# Patient Record
Sex: FEMALE | Race: WHITE | NOT HISPANIC OR LATINO | URBAN - METROPOLITAN AREA
[De-identification: names, ages, dates, MRNs, and addresses within clinical notes are randomized per-mention and may not be internally consistent; named-entity substitution may affect disease eponyms.]

---

## 2019-06-12 ENCOUNTER — OFFICE (OUTPATIENT)
Dept: URBAN - METROPOLITAN AREA CLINIC 42 | Facility: CLINIC | Age: 59
End: 2019-06-12

## 2019-06-12 VITALS
TEMPERATURE: 98.3 F | WEIGHT: 171 LBS | DIASTOLIC BLOOD PRESSURE: 84 MMHG | HEIGHT: 66 IN | HEART RATE: 65 BPM | SYSTOLIC BLOOD PRESSURE: 130 MMHG

## 2019-06-12 DIAGNOSIS — K30 FUNCTIONAL DYSPEPSIA: ICD-10-CM

## 2019-06-12 DIAGNOSIS — R10.30 LOWER ABDOMINAL PAIN, UNSPECIFIED: ICD-10-CM

## 2019-06-12 DIAGNOSIS — K52.9 NONINFECTIVE GASTROENTERITIS AND COLITIS, UNSPECIFIED: ICD-10-CM

## 2019-06-12 PROCEDURE — 99204 OFFICE O/P NEW MOD 45 MIN: CPT

## 2019-06-21 ENCOUNTER — ON CAMPUS - OUTPATIENT (OUTPATIENT)
Dept: URBAN - METROPOLITAN AREA HOSPITAL 91 | Facility: HOSPITAL | Age: 59
End: 2019-06-21

## 2019-06-21 DIAGNOSIS — K52.832 LYMPHOCYTIC COLITIS: ICD-10-CM

## 2019-06-21 DIAGNOSIS — K29.60 OTHER GASTRITIS WITHOUT BLEEDING: ICD-10-CM

## 2019-06-21 DIAGNOSIS — R10.13 EPIGASTRIC PAIN: ICD-10-CM

## 2019-06-21 DIAGNOSIS — K44.9 DIAPHRAGMATIC HERNIA WITHOUT OBSTRUCTION OR GANGRENE: ICD-10-CM

## 2019-06-21 DIAGNOSIS — K57.30 DIVERTICULOSIS OF LARGE INTESTINE WITHOUT PERFORATION OR ABS: ICD-10-CM

## 2019-06-21 DIAGNOSIS — R63.4 ABNORMAL WEIGHT LOSS: ICD-10-CM

## 2019-06-21 PROCEDURE — 43239 EGD BIOPSY SINGLE/MULTIPLE: CPT

## 2019-06-21 PROCEDURE — 45380 COLONOSCOPY AND BIOPSY: CPT

## 2020-02-05 ENCOUNTER — HOSPITAL ENCOUNTER (OUTPATIENT)
Dept: GENERAL RADIOLOGY | Facility: HOSPITAL | Age: 60
Discharge: HOME | End: 2020-02-05

## 2022-07-13 ENCOUNTER — LAB (OUTPATIENT)
Dept: LAB | Facility: HOSPITAL | Age: 62
End: 2022-07-13

## 2022-07-13 ENCOUNTER — TRANSCRIBE ORDERS (OUTPATIENT)
Dept: LAB | Facility: HOSPITAL | Age: 62
End: 2022-07-13

## 2022-07-13 DIAGNOSIS — L40.1 GENERALIZED PUSTULAR PSORIASIS: ICD-10-CM

## 2022-07-13 DIAGNOSIS — L40.1 GENERALIZED PUSTULAR PSORIASIS: Primary | ICD-10-CM

## 2022-07-13 PROCEDURE — 86480 TB TEST CELL IMMUN MEASURE: CPT

## 2022-07-13 PROCEDURE — 36415 COLL VENOUS BLD VENIPUNCTURE: CPT

## 2022-07-15 LAB
GAMMA INTERFERON BACKGROUND BLD IA-ACNC: 0.02 IU/ML
M TB IFN-G BLD-IMP: NEGATIVE
M TB IFN-G CD4+ BCKGRND COR BLD-ACNC: 0.02 IU/ML
M TB IFN-G CD4+CD8+ BCKGRND COR BLD-ACNC: 0.02 IU/ML
MITOGEN IGNF BLD-ACNC: >10 IU/ML
QUANTIFERON INCUBATION: NORMAL
SERVICE CMNT-IMP: NORMAL

## 2022-12-29 ENCOUNTER — LAB (OUTPATIENT)
Dept: LAB | Facility: HOSPITAL | Age: 62
End: 2022-12-29
Payer: MEDICARE

## 2022-12-29 ENCOUNTER — TRANSCRIBE ORDERS (OUTPATIENT)
Dept: ADMINISTRATIVE | Facility: HOSPITAL | Age: 62
End: 2022-12-29
Payer: MEDICARE

## 2022-12-29 DIAGNOSIS — L40.59 POLYARTICULAR PSORIATIC ARTHRITIS: ICD-10-CM

## 2022-12-29 DIAGNOSIS — L40.1 IMPETIGO HERPETIFORMIS: ICD-10-CM

## 2022-12-29 DIAGNOSIS — Z79.899 ENCOUNTER FOR LONG-TERM (CURRENT) USE OF OTHER MEDICATIONS: ICD-10-CM

## 2022-12-29 DIAGNOSIS — L40.1 IMPETIGO HERPETIFORMIS: Primary | ICD-10-CM

## 2022-12-29 LAB
CHOLEST SERPL-MCNC: 157 MG/DL (ref 0–200)
HDLC SERPL-MCNC: 32 MG/DL (ref 40–60)
LDLC SERPL CALC-MCNC: 100 MG/DL (ref 0–100)
LDLC/HDLC SERPL: 3.05 {RATIO}
TRIGL SERPL-MCNC: 137 MG/DL (ref 0–150)
VLDLC SERPL-MCNC: 25 MG/DL (ref 5–40)

## 2022-12-29 PROCEDURE — 80061 LIPID PANEL: CPT

## 2022-12-29 PROCEDURE — 36415 COLL VENOUS BLD VENIPUNCTURE: CPT

## 2023-02-16 ENCOUNTER — APPOINTMENT (OUTPATIENT)
Dept: GENERAL RADIOLOGY | Facility: HOSPITAL | Age: 63
DRG: 871 | End: 2023-02-16
Payer: MEDICARE

## 2023-02-16 ENCOUNTER — TRANSCRIBE ORDERS (OUTPATIENT)
Dept: CARDIOLOGY | Facility: HOSPITAL | Age: 63
End: 2023-02-16
Payer: MEDICARE

## 2023-02-16 ENCOUNTER — HOSPITAL ENCOUNTER (INPATIENT)
Facility: HOSPITAL | Age: 63
LOS: 3 days | Discharge: HOME | DRG: 871 | End: 2023-02-20
Attending: EMERGENCY MEDICINE | Admitting: FAMILY MEDICINE
Payer: MEDICARE

## 2023-02-16 DIAGNOSIS — R53.1 GENERALIZED WEAKNESS: ICD-10-CM

## 2023-02-16 DIAGNOSIS — E87.20 LACTIC ACIDOSIS: ICD-10-CM

## 2023-02-16 DIAGNOSIS — R41.0 CONFUSION: ICD-10-CM

## 2023-02-16 DIAGNOSIS — R07.9 CHEST PAIN, UNSPECIFIED TYPE: Primary | ICD-10-CM

## 2023-02-16 DIAGNOSIS — I48.92 ATRIAL FLUTTER WITH RAPID VENTRICULAR RESPONSE: ICD-10-CM

## 2023-02-16 DIAGNOSIS — A41.9 SEPSIS, DUE TO UNSPECIFIED ORGANISM, UNSPECIFIED WHETHER ACUTE ORGAN DYSFUNCTION PRESENT: Primary | ICD-10-CM

## 2023-02-16 DIAGNOSIS — R26.2 DIFFICULTY WALKING: ICD-10-CM

## 2023-02-16 LAB
ALBUMIN SERPL-MCNC: 3.9 G/DL (ref 3.5–5.2)
ALBUMIN/GLOB SERPL: 1.1 G/DL
ALP SERPL-CCNC: 72 U/L (ref 39–117)
ALT SERPL W P-5'-P-CCNC: 15 U/L (ref 1–33)
ANION GAP SERPL CALCULATED.3IONS-SCNC: 17.2 MMOL/L (ref 5–15)
AST SERPL-CCNC: 12 U/L (ref 1–32)
BACTERIA UR QL AUTO: ABNORMAL /HPF
BASOPHILS # BLD AUTO: 0.04 10*3/MM3 (ref 0–0.2)
BASOPHILS NFR BLD AUTO: 0.2 % (ref 0–1.5)
BILIRUB SERPL-MCNC: 2.6 MG/DL (ref 0–1.2)
BILIRUB UR QL STRIP: ABNORMAL
BUN SERPL-MCNC: 19 MG/DL (ref 8–23)
BUN/CREAT SERPL: 13.6 (ref 7–25)
CALCIUM SPEC-SCNC: 9.2 MG/DL (ref 8.6–10.5)
CHLORIDE SERPL-SCNC: 98 MMOL/L (ref 98–107)
CLARITY UR: CLEAR
CO2 SERPL-SCNC: 17.8 MMOL/L (ref 22–29)
COLOR UR: ABNORMAL
CREAT SERPL-MCNC: 1.4 MG/DL (ref 0.57–1)
D-LACTATE SERPL-SCNC: 3.1 MMOL/L (ref 0.5–2)
DEPRECATED RDW RBC AUTO: 42 FL (ref 37–54)
EGFRCR SERPLBLD CKD-EPI 2021: 42.4 ML/MIN/1.73
EOSINOPHIL # BLD AUTO: 0.07 10*3/MM3 (ref 0–0.4)
EOSINOPHIL NFR BLD AUTO: 0.3 % (ref 0.3–6.2)
ERYTHROCYTE [DISTWIDTH] IN BLOOD BY AUTOMATED COUNT: 12.9 % (ref 12.3–15.4)
FLUAV AG NPH QL: NEGATIVE
FLUBV AG NPH QL IA: NEGATIVE
GIANT PLATELETS: NORMAL
GLOBULIN UR ELPH-MCNC: 3.4 GM/DL
GLUCOSE SERPL-MCNC: 292 MG/DL (ref 65–99)
GLUCOSE UR STRIP-MCNC: ABNORMAL MG/DL
HCT VFR BLD AUTO: 44.9 % (ref 34–46.6)
HGB BLD-MCNC: 15.1 G/DL (ref 12–15.9)
HGB UR QL STRIP.AUTO: ABNORMAL
HOLD SPECIMEN: NORMAL
HOLD SPECIMEN: NORMAL
HYALINE CASTS UR QL AUTO: ABNORMAL /LPF
IMM GRANULOCYTES # BLD AUTO: 1.06 10*3/MM3 (ref 0–0.05)
IMM GRANULOCYTES NFR BLD AUTO: 4.6 % (ref 0–0.5)
KETONES UR QL STRIP: ABNORMAL
LARGE PLATELETS: NORMAL
LEUKOCYTE ESTERASE UR QL STRIP.AUTO: NEGATIVE
LYMPHOCYTES # BLD AUTO: 1.72 10*3/MM3 (ref 0.7–3.1)
LYMPHOCYTES NFR BLD AUTO: 7.5 % (ref 19.6–45.3)
MCH RBC QN AUTO: 30 PG (ref 26.6–33)
MCHC RBC AUTO-ENTMCNC: 33.6 G/DL (ref 31.5–35.7)
MCV RBC AUTO: 89.1 FL (ref 79–97)
MONOCYTES # BLD AUTO: 1.8 10*3/MM3 (ref 0.1–0.9)
MONOCYTES NFR BLD AUTO: 7.9 % (ref 5–12)
NEUTROPHILS NFR BLD AUTO: 18.11 10*3/MM3 (ref 1.7–7)
NEUTROPHILS NFR BLD AUTO: 79.5 % (ref 42.7–76)
NITRITE UR QL STRIP: POSITIVE
NRBC BLD AUTO-RTO: 0 /100 WBC (ref 0–0.2)
PH UR STRIP.AUTO: 5.5 [PH] (ref 5–8)
PLATELET # BLD AUTO: 169 10*3/MM3 (ref 140–450)
PMV BLD AUTO: 9.8 FL (ref 6–12)
POTASSIUM SERPL-SCNC: 3.7 MMOL/L (ref 3.5–5.2)
PROT SERPL-MCNC: 7.3 G/DL (ref 6–8.5)
PROT UR QL STRIP: ABNORMAL
RBC # BLD AUTO: 5.04 10*6/MM3 (ref 3.77–5.28)
RBC # UR STRIP: ABNORMAL /HPF
RBC MORPH BLD: NORMAL
REF LAB TEST METHOD: ABNORMAL
SODIUM SERPL-SCNC: 133 MMOL/L (ref 136–145)
SP GR UR STRIP: 1.02 (ref 1–1.03)
SQUAMOUS #/AREA URNS HPF: ABNORMAL /HPF
UROBILINOGEN UR QL STRIP: ABNORMAL
WBC # UR STRIP: ABNORMAL /HPF
WBC MORPH BLD: NORMAL
WBC NRBC COR # BLD: 22.8 10*3/MM3 (ref 3.4–10.8)
WHOLE BLOOD HOLD COAG: NORMAL
WHOLE BLOOD HOLD SPECIMEN: NORMAL

## 2023-02-16 PROCEDURE — 71045 X-RAY EXAM CHEST 1 VIEW: CPT

## 2023-02-16 PROCEDURE — 87804 INFLUENZA ASSAY W/OPTIC: CPT | Performed by: EMERGENCY MEDICINE

## 2023-02-16 PROCEDURE — 80053 COMPREHEN METABOLIC PANEL: CPT | Performed by: EMERGENCY MEDICINE

## 2023-02-16 PROCEDURE — 87040 BLOOD CULTURE FOR BACTERIA: CPT | Performed by: EMERGENCY MEDICINE

## 2023-02-16 PROCEDURE — 93005 ELECTROCARDIOGRAM TRACING: CPT

## 2023-02-16 PROCEDURE — 85007 BL SMEAR W/DIFF WBC COUNT: CPT | Performed by: EMERGENCY MEDICINE

## 2023-02-16 PROCEDURE — U0004 COV-19 TEST NON-CDC HGH THRU: HCPCS | Performed by: EMERGENCY MEDICINE

## 2023-02-16 PROCEDURE — 99285 EMERGENCY DEPT VISIT HI MDM: CPT

## 2023-02-16 PROCEDURE — 93005 ELECTROCARDIOGRAM TRACING: CPT | Performed by: EMERGENCY MEDICINE

## 2023-02-16 PROCEDURE — 93010 ELECTROCARDIOGRAM REPORT: CPT | Performed by: INTERNAL MEDICINE

## 2023-02-16 PROCEDURE — 81001 URINALYSIS AUTO W/SCOPE: CPT | Performed by: EMERGENCY MEDICINE

## 2023-02-16 PROCEDURE — 83605 ASSAY OF LACTIC ACID: CPT | Performed by: EMERGENCY MEDICINE

## 2023-02-16 PROCEDURE — 85025 COMPLETE CBC W/AUTO DIFF WBC: CPT | Performed by: EMERGENCY MEDICINE

## 2023-02-16 PROCEDURE — 25010000002 ADENOSINE PER 6 MG

## 2023-02-16 PROCEDURE — 25010000002 MEROPENEM PER 100 MG: Performed by: EMERGENCY MEDICINE

## 2023-02-16 PROCEDURE — P9612 CATHETERIZE FOR URINE SPEC: HCPCS

## 2023-02-16 RX ORDER — BUPROPION HYDROCHLORIDE 150 MG/1
150 TABLET, EXTENDED RELEASE ORAL 2 TIMES DAILY
COMMUNITY

## 2023-02-16 RX ORDER — AMLODIPINE BESYLATE AND ATORVASTATIN CALCIUM 10; 40 MG/1; MG/1
1 TABLET, FILM COATED ORAL DAILY
COMMUNITY

## 2023-02-16 RX ORDER — ATORVASTATIN CALCIUM 80 MG/1
80 TABLET, FILM COATED ORAL DAILY
COMMUNITY

## 2023-02-16 RX ORDER — SODIUM CHLORIDE 0.9 % (FLUSH) 0.9 %
10 SYRINGE (ML) INJECTION AS NEEDED
Status: DISCONTINUED | OUTPATIENT
Start: 2023-02-16 | End: 2023-02-20 | Stop reason: HOSPADM

## 2023-02-16 RX ORDER — DILTIAZEM HCL IN NACL,ISO-OSM 125 MG/125
5-15 PLASTIC BAG, INJECTION (ML) INTRAVENOUS
Status: DISCONTINUED | OUTPATIENT
Start: 2023-02-16 | End: 2023-02-18

## 2023-02-16 RX ORDER — FAMOTIDINE 40 MG/1
40 TABLET, FILM COATED ORAL DAILY
COMMUNITY

## 2023-02-16 RX ORDER — ADENOSINE 3 MG/ML
6 INJECTION, SOLUTION INTRAVENOUS ONCE
Status: COMPLETED | OUTPATIENT
Start: 2023-02-16 | End: 2023-02-16

## 2023-02-16 RX ORDER — ADENOSINE 3 MG/ML
INJECTION, SOLUTION INTRAVENOUS
Status: COMPLETED
Start: 2023-02-16 | End: 2023-02-16

## 2023-02-16 RX ORDER — GLIPIZIDE 10 MG/1
10 TABLET, FILM COATED, EXTENDED RELEASE ORAL DAILY
COMMUNITY

## 2023-02-16 RX ADMIN — ADENOSINE 6 MG: 3 INJECTION INTRAVENOUS at 21:25

## 2023-02-16 RX ADMIN — MEROPENEM 1 G: 1 INJECTION, POWDER, FOR SOLUTION INTRAVENOUS at 23:06

## 2023-02-16 RX ADMIN — SODIUM CHLORIDE 1000 ML: 9 INJECTION, SOLUTION INTRAVENOUS at 21:22

## 2023-02-16 RX ADMIN — ADENOSINE 6 MG: 3 INJECTION, SOLUTION INTRAVENOUS at 21:25

## 2023-02-16 RX ADMIN — DILTIAZEM HYDROCHLORIDE 5 MG/HR: 5 INJECTION, SOLUTION INTRAVENOUS at 21:41

## 2023-02-16 RX ADMIN — Medication 10 ML: at 23:05

## 2023-02-17 ENCOUNTER — APPOINTMENT (OUTPATIENT)
Dept: CARDIOLOGY | Facility: HOSPITAL | Age: 63
DRG: 871 | End: 2023-02-17
Payer: MEDICARE

## 2023-02-17 PROBLEM — A41.9 SEPSIS, DUE TO UNSPECIFIED ORGANISM, UNSPECIFIED WHETHER ACUTE ORGAN DYSFUNCTION PRESENT (HCC): Status: ACTIVE | Noted: 2023-02-17

## 2023-02-17 LAB
ALBUMIN SERPL-MCNC: 3.4 G/DL (ref 3.5–5.2)
ALBUMIN/GLOB SERPL: 1.1 G/DL
ALP SERPL-CCNC: 61 U/L (ref 39–117)
ALT SERPL W P-5'-P-CCNC: 12 U/L (ref 1–33)
ANION GAP SERPL CALCULATED.3IONS-SCNC: 13.6 MMOL/L (ref 5–15)
AST SERPL-CCNC: 11 U/L (ref 1–32)
BASOPHILS # BLD AUTO: 0.04 10*3/MM3 (ref 0–0.2)
BASOPHILS NFR BLD AUTO: 0.2 % (ref 0–1.5)
BILIRUB SERPL-MCNC: 1.6 MG/DL (ref 0–1.2)
BUN SERPL-MCNC: 19 MG/DL (ref 8–23)
BUN/CREAT SERPL: 16.5 (ref 7–25)
CALCIUM SPEC-SCNC: 8.8 MG/DL (ref 8.6–10.5)
CHLORIDE SERPL-SCNC: 104 MMOL/L (ref 98–107)
CO2 SERPL-SCNC: 18.4 MMOL/L (ref 22–29)
CREAT SERPL-MCNC: 1.15 MG/DL (ref 0.57–1)
D-LACTATE SERPL-SCNC: 2 MMOL/L (ref 0.5–2)
D-LACTATE SERPL-SCNC: 2.1 MMOL/L (ref 0.5–2)
DEPRECATED RDW RBC AUTO: 42.4 FL (ref 37–54)
EGFRCR SERPLBLD CKD-EPI 2021: 53.6 ML/MIN/1.73
EOSINOPHIL # BLD AUTO: 0.01 10*3/MM3 (ref 0–0.4)
EOSINOPHIL NFR BLD AUTO: 0 % (ref 0.3–6.2)
ERYTHROCYTE [DISTWIDTH] IN BLOOD BY AUTOMATED COUNT: 12.9 % (ref 12.3–15.4)
GLOBULIN UR ELPH-MCNC: 3.1 GM/DL
GLUCOSE SERPL-MCNC: 192 MG/DL (ref 65–99)
HCT VFR BLD AUTO: 41 % (ref 34–46.6)
HGB BLD-MCNC: 14 G/DL (ref 12–15.9)
HOLD SPECIMEN: NORMAL
IMM GRANULOCYTES # BLD AUTO: 0.25 10*3/MM3 (ref 0–0.05)
IMM GRANULOCYTES NFR BLD AUTO: 1.2 % (ref 0–0.5)
L PNEUMO1 AG UR QL IA: NEGATIVE
LYMPHOCYTES # BLD AUTO: 2.65 10*3/MM3 (ref 0.7–3.1)
LYMPHOCYTES NFR BLD AUTO: 12.9 % (ref 19.6–45.3)
M PNEUMO IGM SER QL: NEGATIVE
MCH RBC QN AUTO: 30.6 PG (ref 26.6–33)
MCHC RBC AUTO-ENTMCNC: 34.1 G/DL (ref 31.5–35.7)
MCV RBC AUTO: 89.7 FL (ref 79–97)
MONOCYTES # BLD AUTO: 2.02 10*3/MM3 (ref 0.1–0.9)
MONOCYTES NFR BLD AUTO: 9.8 % (ref 5–12)
NEUTROPHILS NFR BLD AUTO: 15.56 10*3/MM3 (ref 1.7–7)
NEUTROPHILS NFR BLD AUTO: 75.9 % (ref 42.7–76)
NRBC BLD AUTO-RTO: 0 /100 WBC (ref 0–0.2)
PLATELET # BLD AUTO: 143 10*3/MM3 (ref 140–450)
PMV BLD AUTO: 9.2 FL (ref 6–12)
POTASSIUM SERPL-SCNC: 3.6 MMOL/L (ref 3.5–5.2)
PROCALCITONIN SERPL-MCNC: 6.65 NG/ML (ref 0–0.25)
PROT SERPL-MCNC: 6.5 G/DL (ref 6–8.5)
RBC # BLD AUTO: 4.57 10*6/MM3 (ref 3.77–5.28)
S PNEUM AG SPEC QL LA: NEGATIVE
SARS-COV-2 RNA RESP QL NAA+PROBE: NOT DETECTED
SODIUM SERPL-SCNC: 136 MMOL/L (ref 136–145)
WBC NRBC COR # BLD: 20.53 10*3/MM3 (ref 3.4–10.8)
WHOLE BLOOD HOLD COAG: NORMAL
WHOLE BLOOD HOLD SPECIMEN: NORMAL

## 2023-02-17 PROCEDURE — 83605 ASSAY OF LACTIC ACID: CPT | Performed by: EMERGENCY MEDICINE

## 2023-02-17 PROCEDURE — 25010000002 HEPARIN (PORCINE) PER 1000 UNITS: Performed by: FAMILY MEDICINE

## 2023-02-17 PROCEDURE — 87899 AGENT NOS ASSAY W/OPTIC: CPT | Performed by: FAMILY MEDICINE

## 2023-02-17 PROCEDURE — 93306 TTE W/DOPPLER COMPLETE: CPT

## 2023-02-17 PROCEDURE — 84145 PROCALCITONIN (PCT): CPT | Performed by: FAMILY MEDICINE

## 2023-02-17 PROCEDURE — 87205 SMEAR GRAM STAIN: CPT | Performed by: FAMILY MEDICINE

## 2023-02-17 PROCEDURE — 25010000002 VANCOMYCIN 5 G RECONSTITUTED SOLUTION: Performed by: FAMILY MEDICINE

## 2023-02-17 PROCEDURE — 87449 NOS EACH ORGANISM AG IA: CPT | Performed by: FAMILY MEDICINE

## 2023-02-17 PROCEDURE — 87040 BLOOD CULTURE FOR BACTERIA: CPT | Performed by: FAMILY MEDICINE

## 2023-02-17 PROCEDURE — 99223 1ST HOSP IP/OBS HIGH 75: CPT | Performed by: FAMILY MEDICINE

## 2023-02-17 PROCEDURE — 80053 COMPREHEN METABOLIC PANEL: CPT | Performed by: FAMILY MEDICINE

## 2023-02-17 PROCEDURE — 87070 CULTURE OTHR SPECIMN AEROBIC: CPT | Performed by: FAMILY MEDICINE

## 2023-02-17 PROCEDURE — 85025 COMPLETE CBC W/AUTO DIFF WBC: CPT | Performed by: FAMILY MEDICINE

## 2023-02-17 PROCEDURE — 25010000002 ONDANSETRON PER 1 MG: Performed by: FAMILY MEDICINE

## 2023-02-17 PROCEDURE — 94799 UNLISTED PULMONARY SVC/PX: CPT

## 2023-02-17 PROCEDURE — 86738 MYCOPLASMA ANTIBODY: CPT | Performed by: FAMILY MEDICINE

## 2023-02-17 PROCEDURE — 25010000002 MEROPENEM PER 100 MG: Performed by: FAMILY MEDICINE

## 2023-02-17 PROCEDURE — 25010000002 VANCOMYCIN 5 G RECONSTITUTED SOLUTION: Performed by: EMERGENCY MEDICINE

## 2023-02-17 PROCEDURE — 36415 COLL VENOUS BLD VENIPUNCTURE: CPT | Performed by: EMERGENCY MEDICINE

## 2023-02-17 RX ORDER — ACETAMINOPHEN 325 MG/1
650 TABLET ORAL EVERY 6 HOURS PRN
Status: DISCONTINUED | OUTPATIENT
Start: 2023-02-17 | End: 2023-02-20 | Stop reason: HOSPADM

## 2023-02-17 RX ORDER — SODIUM CHLORIDE 0.9 % (FLUSH) 0.9 %
10 SYRINGE (ML) INJECTION EVERY 12 HOURS SCHEDULED
Status: DISCONTINUED | OUTPATIENT
Start: 2023-02-17 | End: 2023-02-20 | Stop reason: HOSPADM

## 2023-02-17 RX ORDER — SODIUM CHLORIDE 0.9 % (FLUSH) 0.9 %
10 SYRINGE (ML) INJECTION AS NEEDED
Status: DISCONTINUED | OUTPATIENT
Start: 2023-02-17 | End: 2023-02-20 | Stop reason: HOSPADM

## 2023-02-17 RX ORDER — SODIUM CHLORIDE 9 MG/ML
40 INJECTION, SOLUTION INTRAVENOUS AS NEEDED
Status: DISCONTINUED | OUTPATIENT
Start: 2023-02-17 | End: 2023-02-20 | Stop reason: HOSPADM

## 2023-02-17 RX ORDER — ONDANSETRON 2 MG/ML
4 INJECTION INTRAMUSCULAR; INTRAVENOUS EVERY 6 HOURS PRN
Status: DISCONTINUED | OUTPATIENT
Start: 2023-02-17 | End: 2023-02-20 | Stop reason: HOSPADM

## 2023-02-17 RX ORDER — SODIUM CHLORIDE 9 MG/ML
125 INJECTION, SOLUTION INTRAVENOUS CONTINUOUS
Status: DISCONTINUED | OUTPATIENT
Start: 2023-02-17 | End: 2023-02-18

## 2023-02-17 RX ORDER — HEPARIN SODIUM 5000 [USP'U]/ML
5000 INJECTION, SOLUTION INTRAVENOUS; SUBCUTANEOUS EVERY 8 HOURS SCHEDULED
Status: DISCONTINUED | OUTPATIENT
Start: 2023-02-17 | End: 2023-02-20 | Stop reason: HOSPADM

## 2023-02-17 RX ORDER — NITROGLYCERIN 0.4 MG/1
0.4 TABLET SUBLINGUAL
Status: DISCONTINUED | OUTPATIENT
Start: 2023-02-17 | End: 2023-02-20 | Stop reason: HOSPADM

## 2023-02-17 RX ADMIN — Medication 10 ML: at 08:35

## 2023-02-17 RX ADMIN — MEROPENEM 1 G: 1 INJECTION, POWDER, FOR SOLUTION INTRAVENOUS at 23:10

## 2023-02-17 RX ADMIN — VANCOMYCIN HYDROCHLORIDE 750 MG: 5 INJECTION, POWDER, LYOPHILIZED, FOR SOLUTION INTRAVENOUS at 23:11

## 2023-02-17 RX ADMIN — ACETAMINOPHEN 650 MG: 325 TABLET ORAL at 23:18

## 2023-02-17 RX ADMIN — VANCOMYCIN HYDROCHLORIDE 1750 MG: 5 INJECTION, POWDER, LYOPHILIZED, FOR SOLUTION INTRAVENOUS at 00:10

## 2023-02-17 RX ADMIN — ACETAMINOPHEN 650 MG: 325 TABLET ORAL at 14:00

## 2023-02-17 RX ADMIN — HEPARIN SODIUM 5000 UNITS: 5000 INJECTION INTRAVENOUS; SUBCUTANEOUS at 21:22

## 2023-02-17 RX ADMIN — HEPARIN SODIUM 5000 UNITS: 5000 INJECTION INTRAVENOUS; SUBCUTANEOUS at 13:40

## 2023-02-17 RX ADMIN — DILTIAZEM HYDROCHLORIDE 15 MG/HR: 5 INJECTION, SOLUTION INTRAVENOUS at 04:05

## 2023-02-17 RX ADMIN — MEROPENEM 1 G: 1 INJECTION, POWDER, FOR SOLUTION INTRAVENOUS at 16:24

## 2023-02-17 RX ADMIN — MEROPENEM 1 G: 1 INJECTION, POWDER, FOR SOLUTION INTRAVENOUS at 08:35

## 2023-02-17 RX ADMIN — SODIUM CHLORIDE 125 ML/HR: 9 INJECTION, SOLUTION INTRAVENOUS at 03:29

## 2023-02-17 RX ADMIN — VANCOMYCIN HYDROCHLORIDE 750 MG: 5 INJECTION, POWDER, LYOPHILIZED, FOR SOLUTION INTRAVENOUS at 12:01

## 2023-02-17 RX ADMIN — SODIUM CHLORIDE 125 ML/HR: 9 INJECTION, SOLUTION INTRAVENOUS at 16:34

## 2023-02-17 RX ADMIN — ONDANSETRON 4 MG: 2 INJECTION INTRAMUSCULAR; INTRAVENOUS at 17:09

## 2023-02-17 RX ADMIN — DOXYCYCLINE 100 MG: 100 INJECTION, POWDER, LYOPHILIZED, FOR SOLUTION INTRAVENOUS at 04:48

## 2023-02-17 RX ADMIN — HEPARIN SODIUM 5000 UNITS: 5000 INJECTION INTRAVENOUS; SUBCUTANEOUS at 06:01

## 2023-02-17 RX ADMIN — DOXYCYCLINE 100 MG: 100 INJECTION, POWDER, LYOPHILIZED, FOR SOLUTION INTRAVENOUS at 13:40

## 2023-02-18 ENCOUNTER — APPOINTMENT (OUTPATIENT)
Dept: CT IMAGING | Facility: HOSPITAL | Age: 63
DRG: 871 | End: 2023-02-18
Payer: MEDICARE

## 2023-02-18 LAB
ALBUMIN SERPL-MCNC: 3 G/DL (ref 3.5–5.2)
ALP SERPL-CCNC: 58 U/L (ref 39–117)
ALT SERPL W P-5'-P-CCNC: 9 U/L (ref 1–33)
ANION GAP SERPL CALCULATED.3IONS-SCNC: 7.3 MMOL/L (ref 5–15)
AST SERPL-CCNC: 10 U/L (ref 1–32)
BASOPHILS # BLD AUTO: 0.05 10*3/MM3 (ref 0–0.2)
BASOPHILS NFR BLD AUTO: 0.5 % (ref 0–1.5)
BILIRUB CONJ SERPL-MCNC: 0.2 MG/DL (ref 0–0.3)
BILIRUB INDIRECT SERPL-MCNC: 0.3 MG/DL
BILIRUB SERPL-MCNC: 0.5 MG/DL (ref 0–1.2)
BUN SERPL-MCNC: 16 MG/DL (ref 8–23)
BUN/CREAT SERPL: 17.8 (ref 7–25)
CALCIUM SPEC-SCNC: 8.5 MG/DL (ref 8.6–10.5)
CHLORIDE SERPL-SCNC: 108 MMOL/L (ref 98–107)
CO2 SERPL-SCNC: 21.7 MMOL/L (ref 22–29)
CREAT SERPL-MCNC: 0.9 MG/DL (ref 0.57–1)
DEPRECATED RDW RBC AUTO: 43.2 FL (ref 37–54)
EGFRCR SERPLBLD CKD-EPI 2021: 72 ML/MIN/1.73
EOSINOPHIL # BLD AUTO: 0.27 10*3/MM3 (ref 0–0.4)
EOSINOPHIL NFR BLD AUTO: 2.5 % (ref 0.3–6.2)
ERYTHROCYTE [DISTWIDTH] IN BLOOD BY AUTOMATED COUNT: 13.1 % (ref 12.3–15.4)
GLUCOSE BLDC GLUCOMTR-MCNC: 140 MG/DL (ref 70–99)
GLUCOSE BLDC GLUCOMTR-MCNC: 144 MG/DL (ref 70–99)
GLUCOSE SERPL-MCNC: 146 MG/DL (ref 65–99)
HCT VFR BLD AUTO: 34.3 % (ref 34–46.6)
HGB BLD-MCNC: 11.4 G/DL (ref 12–15.9)
IMM GRANULOCYTES # BLD AUTO: 0.08 10*3/MM3 (ref 0–0.05)
IMM GRANULOCYTES NFR BLD AUTO: 0.7 % (ref 0–0.5)
LYMPHOCYTES # BLD AUTO: 1.9 10*3/MM3 (ref 0.7–3.1)
LYMPHOCYTES NFR BLD AUTO: 17.8 % (ref 19.6–45.3)
MAGNESIUM SERPL-MCNC: 1.8 MG/DL (ref 1.6–2.4)
MCH RBC QN AUTO: 30.2 PG (ref 26.6–33)
MCHC RBC AUTO-ENTMCNC: 33.2 G/DL (ref 31.5–35.7)
MCV RBC AUTO: 91 FL (ref 79–97)
MONOCYTES # BLD AUTO: 0.97 10*3/MM3 (ref 0.1–0.9)
MONOCYTES NFR BLD AUTO: 9.1 % (ref 5–12)
NEUTROPHILS NFR BLD AUTO: 69.4 % (ref 42.7–76)
NEUTROPHILS NFR BLD AUTO: 7.41 10*3/MM3 (ref 1.7–7)
NRBC BLD AUTO-RTO: 0 /100 WBC (ref 0–0.2)
PHOSPHATE SERPL-MCNC: 1.8 MG/DL (ref 2.5–4.5)
PLATELET # BLD AUTO: 135 10*3/MM3 (ref 140–450)
PMV BLD AUTO: 10 FL (ref 6–12)
POTASSIUM SERPL-SCNC: 4.6 MMOL/L (ref 3.5–5.2)
PROT SERPL-MCNC: 5.8 G/DL (ref 6–8.5)
RBC # BLD AUTO: 3.77 10*6/MM3 (ref 3.77–5.28)
SODIUM SERPL-SCNC: 137 MMOL/L (ref 136–145)
WBC NRBC COR # BLD: 10.68 10*3/MM3 (ref 3.4–10.8)

## 2023-02-18 PROCEDURE — 97161 PT EVAL LOW COMPLEX 20 MIN: CPT

## 2023-02-18 PROCEDURE — 82962 GLUCOSE BLOOD TEST: CPT

## 2023-02-18 PROCEDURE — 0 IOPAMIDOL PER 1 ML: Performed by: FAMILY MEDICINE

## 2023-02-18 PROCEDURE — 25010000002 HEPARIN (PORCINE) PER 1000 UNITS: Performed by: FAMILY MEDICINE

## 2023-02-18 PROCEDURE — 63710000001 INSULIN LISPRO (HUMAN) PER 5 UNITS: Performed by: FAMILY MEDICINE

## 2023-02-18 PROCEDURE — 83735 ASSAY OF MAGNESIUM: CPT | Performed by: FAMILY MEDICINE

## 2023-02-18 PROCEDURE — 71260 CT THORAX DX C+: CPT

## 2023-02-18 PROCEDURE — 84100 ASSAY OF PHOSPHORUS: CPT | Performed by: FAMILY MEDICINE

## 2023-02-18 PROCEDURE — 80076 HEPATIC FUNCTION PANEL: CPT | Performed by: FAMILY MEDICINE

## 2023-02-18 PROCEDURE — 85025 COMPLETE CBC W/AUTO DIFF WBC: CPT | Performed by: FAMILY MEDICINE

## 2023-02-18 PROCEDURE — 99233 SBSQ HOSP IP/OBS HIGH 50: CPT | Performed by: FAMILY MEDICINE

## 2023-02-18 PROCEDURE — 80048 BASIC METABOLIC PNL TOTAL CA: CPT | Performed by: FAMILY MEDICINE

## 2023-02-18 PROCEDURE — 94799 UNLISTED PULMONARY SVC/PX: CPT

## 2023-02-18 RX ORDER — ATORVASTATIN CALCIUM 40 MG/1
80 TABLET, FILM COATED ORAL DAILY
Status: DISCONTINUED | OUTPATIENT
Start: 2023-02-18 | End: 2023-02-20 | Stop reason: HOSPADM

## 2023-02-18 RX ORDER — NICOTINE POLACRILEX 4 MG
15 LOZENGE BUCCAL
Status: DISCONTINUED | OUTPATIENT
Start: 2023-02-18 | End: 2023-02-20 | Stop reason: HOSPADM

## 2023-02-18 RX ORDER — FENTANYL/ROPIVACAINE/NS/PF 2-625MCG/1
15 PLASTIC BAG, INJECTION (ML) EPIDURAL
Status: COMPLETED | OUTPATIENT
Start: 2023-02-18 | End: 2023-02-18

## 2023-02-18 RX ORDER — FAMOTIDINE 20 MG/1
40 TABLET, FILM COATED ORAL DAILY
Status: DISCONTINUED | OUTPATIENT
Start: 2023-02-18 | End: 2023-02-20 | Stop reason: HOSPADM

## 2023-02-18 RX ORDER — BUPROPION HYDROCHLORIDE 150 MG/1
150 TABLET, EXTENDED RELEASE ORAL 2 TIMES DAILY
Status: DISCONTINUED | OUTPATIENT
Start: 2023-02-18 | End: 2023-02-20 | Stop reason: HOSPADM

## 2023-02-18 RX ORDER — INSULIN LISPRO 100 [IU]/ML
1-200 INJECTION, SOLUTION INTRAVENOUS; SUBCUTANEOUS AS NEEDED
Status: DISCONTINUED | OUTPATIENT
Start: 2023-02-18 | End: 2023-02-20 | Stop reason: HOSPADM

## 2023-02-18 RX ORDER — DEXTROSE MONOHYDRATE 25 G/50ML
10-50 INJECTION, SOLUTION INTRAVENOUS
Status: DISCONTINUED | OUTPATIENT
Start: 2023-02-18 | End: 2023-02-20 | Stop reason: HOSPADM

## 2023-02-18 RX ORDER — INSULIN LISPRO 100 [IU]/ML
1-200 INJECTION, SOLUTION INTRAVENOUS; SUBCUTANEOUS
Status: DISCONTINUED | OUTPATIENT
Start: 2023-02-18 | End: 2023-02-20 | Stop reason: HOSPADM

## 2023-02-18 RX ADMIN — HEPARIN SODIUM 5000 UNITS: 5000 INJECTION INTRAVENOUS; SUBCUTANEOUS at 22:13

## 2023-02-18 RX ADMIN — POTASSIUM PHOSPHATE, MONOBASIC AND POTASSIUM PHOSPHATE, DIBASIC 15 MMOL: 224; 236 INJECTION, SOLUTION, CONCENTRATE INTRAVENOUS at 09:07

## 2023-02-18 RX ADMIN — POTASSIUM PHOSPHATE, MONOBASIC AND POTASSIUM PHOSPHATE, DIBASIC 15 MMOL: 224; 236 INJECTION, SOLUTION, CONCENTRATE INTRAVENOUS at 06:29

## 2023-02-18 RX ADMIN — ACETAMINOPHEN 650 MG: 325 TABLET ORAL at 05:03

## 2023-02-18 RX ADMIN — DOXYCYCLINE 100 MG: 100 INJECTION, POWDER, LYOPHILIZED, FOR SOLUTION INTRAVENOUS at 01:18

## 2023-02-18 RX ADMIN — HEPARIN SODIUM 5000 UNITS: 5000 INJECTION INTRAVENOUS; SUBCUTANEOUS at 05:12

## 2023-02-18 RX ADMIN — INSULIN LISPRO 1 UNITS: 100 INJECTION, SOLUTION INTRAVENOUS; SUBCUTANEOUS at 18:41

## 2023-02-18 RX ADMIN — Medication 10 ML: at 09:07

## 2023-02-18 RX ADMIN — FAMOTIDINE 40 MG: 20 TABLET ORAL at 14:19

## 2023-02-18 RX ADMIN — IOPAMIDOL 100 ML: 755 INJECTION, SOLUTION INTRAVENOUS at 18:16

## 2023-02-18 RX ADMIN — BUPROPION HYDROCHLORIDE 150 MG: 150 TABLET, EXTENDED RELEASE ORAL at 22:12

## 2023-02-18 RX ADMIN — HEPARIN SODIUM 5000 UNITS: 5000 INJECTION INTRAVENOUS; SUBCUTANEOUS at 14:19

## 2023-02-18 RX ADMIN — DOXYCYCLINE 100 MG: 100 INJECTION, POWDER, LYOPHILIZED, FOR SOLUTION INTRAVENOUS at 16:30

## 2023-02-18 RX ADMIN — ATORVASTATIN CALCIUM 80 MG: 40 TABLET, FILM COATED ORAL at 14:19

## 2023-02-18 RX ADMIN — METOPROLOL TARTRATE 25 MG: 25 TABLET, FILM COATED ORAL at 22:12

## 2023-02-19 LAB
ALBUMIN SERPL-MCNC: 3.2 G/DL (ref 3.5–5.2)
ALP SERPL-CCNC: 63 U/L (ref 39–117)
ALT SERPL W P-5'-P-CCNC: 12 U/L (ref 1–33)
ANION GAP SERPL CALCULATED.3IONS-SCNC: 9.5 MMOL/L (ref 5–15)
ASCENDING AORTA: 3 CM
AST SERPL-CCNC: 12 U/L (ref 1–32)
BACTERIA SPEC RESP CULT: NORMAL
BASOPHILS # BLD AUTO: 0.04 10*3/MM3 (ref 0–0.2)
BASOPHILS NFR BLD AUTO: 0.5 % (ref 0–1.5)
BH CV ECHO MEAS - AO ROOT DIAM: 2.8 CM
BH CV ECHO MEAS - EDV(MOD-SP2): 55 ML
BH CV ECHO MEAS - EDV(MOD-SP4): 75 ML
BH CV ECHO MEAS - EF(MOD-BP): 67 %
BH CV ECHO MEAS - ESV(MOD-SP2): 17 ML
BH CV ECHO MEAS - ESV(MOD-SP4): 25 ML
BH CV ECHO MEAS - IVSD: 1.1 CM
BH CV ECHO MEAS - LA DIMENSION: 4.3 CM
BH CV ECHO MEAS - LAT PEAK E' VEL: 7.4 CM/SEC
BH CV ECHO MEAS - LVIDD: 3.8 CM
BH CV ECHO MEAS - LVIDS: 2.5 CM
BH CV ECHO MEAS - LVOT DIAM: 2 CM
BH CV ECHO MEAS - LVPWD: 1 CM
BH CV ECHO MEAS - MED PEAK E' VEL: 5.66 CM/SEC
BH CV ECHO MEAS - MV A MAX VEL: 85 CM/SEC
BH CV ECHO MEAS - MV DEC TIME: 222 MSEC
BH CV ECHO MEAS - MV E MAX VEL: 118 CM/SEC
BH CV ECHO MEAS - MV E/A: 1.4
BH CV ECHO MEAS - RAP SYSTOLE: 3 MMHG
BH CV ECHO MEAS - RVDD: 2.7 CM
BH CV ECHO MEAS - RVSP: 27 MMHG
BH CV ECHO MEAS - TR MAX PG: 24 MMHG
BH CV ECHO MEAS - TR MAX VEL: 247 CM/SEC
BH CV ECHO MEASUREMENTS AVERAGE E/E' RATIO: 18.07
BILIRUB CONJ SERPL-MCNC: 0.2 MG/DL (ref 0–0.3)
BILIRUB INDIRECT SERPL-MCNC: 0.3 MG/DL
BILIRUB SERPL-MCNC: 0.5 MG/DL (ref 0–1.2)
BUN SERPL-MCNC: 13 MG/DL (ref 8–23)
BUN/CREAT SERPL: 13.1 (ref 7–25)
CALCIUM SPEC-SCNC: 9 MG/DL (ref 8.6–10.5)
CHLORIDE SERPL-SCNC: 104 MMOL/L (ref 98–107)
CO2 SERPL-SCNC: 22.5 MMOL/L (ref 22–29)
CREAT SERPL-MCNC: 0.99 MG/DL (ref 0.57–1)
DEPRECATED RDW RBC AUTO: 43.6 FL (ref 37–54)
EGFRCR SERPLBLD CKD-EPI 2021: 64.2 ML/MIN/1.73
EOSINOPHIL # BLD AUTO: 0.31 10*3/MM3 (ref 0–0.4)
EOSINOPHIL NFR BLD AUTO: 3.6 % (ref 0.3–6.2)
ERYTHROCYTE [DISTWIDTH] IN BLOOD BY AUTOMATED COUNT: 13.2 % (ref 12.3–15.4)
GLUCOSE BLDC GLUCOMTR-MCNC: 114 MG/DL (ref 70–99)
GLUCOSE BLDC GLUCOMTR-MCNC: 120 MG/DL (ref 70–99)
GLUCOSE BLDC GLUCOMTR-MCNC: 132 MG/DL (ref 70–99)
GLUCOSE BLDC GLUCOMTR-MCNC: 175 MG/DL (ref 70–99)
GLUCOSE SERPL-MCNC: 130 MG/DL (ref 65–99)
GRAM STN SPEC: NORMAL
HCT VFR BLD AUTO: 34 % (ref 34–46.6)
HGB BLD-MCNC: 11.4 G/DL (ref 12–15.9)
IMM GRANULOCYTES # BLD AUTO: 0.07 10*3/MM3 (ref 0–0.05)
IMM GRANULOCYTES NFR BLD AUTO: 0.8 % (ref 0–0.5)
IVRT: 82 MSEC
LEFT ATRIUM VOLUME INDEX: 30.8 ML/M2
LYMPHOCYTES # BLD AUTO: 2.28 10*3/MM3 (ref 0.7–3.1)
LYMPHOCYTES NFR BLD AUTO: 26.6 % (ref 19.6–45.3)
MAGNESIUM SERPL-MCNC: 1.7 MG/DL (ref 1.6–2.4)
MAXIMAL PREDICTED HEART RATE: 157 BPM
MCH RBC QN AUTO: 30.1 PG (ref 26.6–33)
MCHC RBC AUTO-ENTMCNC: 33.5 G/DL (ref 31.5–35.7)
MCV RBC AUTO: 89.7 FL (ref 79–97)
MONOCYTES # BLD AUTO: 0.83 10*3/MM3 (ref 0.1–0.9)
MONOCYTES NFR BLD AUTO: 9.7 % (ref 5–12)
NEUTROPHILS NFR BLD AUTO: 5.04 10*3/MM3 (ref 1.7–7)
NEUTROPHILS NFR BLD AUTO: 58.8 % (ref 42.7–76)
NRBC BLD AUTO-RTO: 0 /100 WBC (ref 0–0.2)
PHOSPHATE SERPL-MCNC: 3.1 MG/DL (ref 2.5–4.5)
PLATELET # BLD AUTO: 187 10*3/MM3 (ref 140–450)
PMV BLD AUTO: 9.5 FL (ref 6–12)
POTASSIUM SERPL-SCNC: 3.9 MMOL/L (ref 3.5–5.2)
PROCALCITONIN SERPL-MCNC: 2.58 NG/ML (ref 0–0.25)
PROT SERPL-MCNC: 6.1 G/DL (ref 6–8.5)
QT INTERVAL: 292 MS
QT INTERVAL: 301 MS
RBC # BLD AUTO: 3.79 10*6/MM3 (ref 3.77–5.28)
SODIUM SERPL-SCNC: 136 MMOL/L (ref 136–145)
STRESS TARGET HR: 133 BPM
WBC NRBC COR # BLD: 8.57 10*3/MM3 (ref 3.4–10.8)

## 2023-02-19 PROCEDURE — 82962 GLUCOSE BLOOD TEST: CPT

## 2023-02-19 PROCEDURE — 84145 PROCALCITONIN (PCT): CPT | Performed by: FAMILY MEDICINE

## 2023-02-19 PROCEDURE — 80076 HEPATIC FUNCTION PANEL: CPT | Performed by: FAMILY MEDICINE

## 2023-02-19 PROCEDURE — 85025 COMPLETE CBC W/AUTO DIFF WBC: CPT | Performed by: FAMILY MEDICINE

## 2023-02-19 PROCEDURE — 84100 ASSAY OF PHOSPHORUS: CPT | Performed by: FAMILY MEDICINE

## 2023-02-19 PROCEDURE — 25010000002 HEPARIN (PORCINE) PER 1000 UNITS: Performed by: FAMILY MEDICINE

## 2023-02-19 PROCEDURE — 80048 BASIC METABOLIC PNL TOTAL CA: CPT | Performed by: FAMILY MEDICINE

## 2023-02-19 PROCEDURE — 99232 SBSQ HOSP IP/OBS MODERATE 35: CPT | Performed by: FAMILY MEDICINE

## 2023-02-19 PROCEDURE — 94799 UNLISTED PULMONARY SVC/PX: CPT

## 2023-02-19 PROCEDURE — 83735 ASSAY OF MAGNESIUM: CPT | Performed by: FAMILY MEDICINE

## 2023-02-19 RX ADMIN — METOPROLOL TARTRATE 25 MG: 25 TABLET, FILM COATED ORAL at 20:55

## 2023-02-19 RX ADMIN — METOPROLOL TARTRATE 25 MG: 25 TABLET, FILM COATED ORAL at 09:48

## 2023-02-19 RX ADMIN — HEPARIN SODIUM 5000 UNITS: 5000 INJECTION INTRAVENOUS; SUBCUTANEOUS at 21:42

## 2023-02-19 RX ADMIN — Medication 10 ML: at 09:49

## 2023-02-19 RX ADMIN — ACETAMINOPHEN 650 MG: 325 TABLET ORAL at 12:16

## 2023-02-19 RX ADMIN — HEPARIN SODIUM 5000 UNITS: 5000 INJECTION INTRAVENOUS; SUBCUTANEOUS at 13:44

## 2023-02-19 RX ADMIN — ATORVASTATIN CALCIUM 80 MG: 40 TABLET, FILM COATED ORAL at 09:49

## 2023-02-19 RX ADMIN — HEPARIN SODIUM 5000 UNITS: 5000 INJECTION INTRAVENOUS; SUBCUTANEOUS at 05:59

## 2023-02-19 RX ADMIN — FAMOTIDINE 40 MG: 20 TABLET ORAL at 09:48

## 2023-02-19 RX ADMIN — DOXYCYCLINE 100 MG: 100 INJECTION, POWDER, LYOPHILIZED, FOR SOLUTION INTRAVENOUS at 01:12

## 2023-02-19 RX ADMIN — DOXYCYCLINE 100 MG: 100 INJECTION, POWDER, LYOPHILIZED, FOR SOLUTION INTRAVENOUS at 13:43

## 2023-02-19 RX ADMIN — BUPROPION HYDROCHLORIDE 150 MG: 150 TABLET, EXTENDED RELEASE ORAL at 20:55

## 2023-02-19 RX ADMIN — Medication 10 ML: at 20:55

## 2023-02-19 RX ADMIN — BUPROPION HYDROCHLORIDE 150 MG: 150 TABLET, EXTENDED RELEASE ORAL at 09:48

## 2023-02-20 VITALS
HEIGHT: 64 IN | HEART RATE: 74 BPM | WEIGHT: 188.93 LBS | RESPIRATION RATE: 18 BRPM | DIASTOLIC BLOOD PRESSURE: 107 MMHG | SYSTOLIC BLOOD PRESSURE: 141 MMHG | BODY MASS INDEX: 32.26 KG/M2 | OXYGEN SATURATION: 96 % | TEMPERATURE: 98.8 F

## 2023-02-20 PROBLEM — F41.8 MIXED ANXIETY AND DEPRESSIVE DISORDER: Status: ACTIVE | Noted: 2019-01-15

## 2023-02-20 PROBLEM — K21.9 GASTROESOPHAGEAL REFLUX DISEASE: Status: ACTIVE | Noted: 2017-06-09

## 2023-02-20 PROBLEM — I10 ESSENTIAL HYPERTENSION: Status: ACTIVE | Noted: 2019-01-15

## 2023-02-20 PROBLEM — A41.9 SEPSIS, DUE TO UNSPECIFIED ORGANISM, UNSPECIFIED WHETHER ACUTE ORGAN DYSFUNCTION PRESENT: Status: RESOLVED | Noted: 2023-02-17 | Resolved: 2023-02-20

## 2023-02-20 PROBLEM — G43.909 MIGRAINE HEADACHE: Status: ACTIVE | Noted: 2023-02-20

## 2023-02-20 PROBLEM — J44.1 COPD WITH ACUTE EXACERBATION: Status: ACTIVE | Noted: 2023-02-20

## 2023-02-20 PROBLEM — I48.0 PAROXYSMAL ATRIAL FIBRILLATION: Status: ACTIVE | Noted: 2022-01-31

## 2023-02-20 PROBLEM — E78.5 HYPERLIPIDEMIA: Status: ACTIVE | Noted: 2019-06-13

## 2023-02-20 PROBLEM — Z90.5 ABSENT KIDNEY: Status: ACTIVE | Noted: 2022-01-31

## 2023-02-20 PROBLEM — E11.9 TYPE 2 DIABETES MELLITUS: Status: ACTIVE | Noted: 2020-02-03

## 2023-02-20 PROBLEM — J18.9 PNEUMONIA OF RIGHT LOWER LOBE DUE TO INFECTIOUS ORGANISM: Status: ACTIVE | Noted: 2023-02-20

## 2023-02-20 PROBLEM — N18.30 STAGE 3 CHRONIC KIDNEY DISEASE: Status: ACTIVE | Noted: 2022-01-31

## 2023-02-20 LAB
ALBUMIN SERPL-MCNC: 3.7 G/DL (ref 3.5–5.2)
ALP SERPL-CCNC: 66 U/L (ref 39–117)
ALT SERPL W P-5'-P-CCNC: 14 U/L (ref 1–33)
ANION GAP SERPL CALCULATED.3IONS-SCNC: 11.4 MMOL/L (ref 5–15)
AST SERPL-CCNC: 16 U/L (ref 1–32)
BASOPHILS # BLD AUTO: 0.05 10*3/MM3 (ref 0–0.2)
BASOPHILS NFR BLD AUTO: 0.6 % (ref 0–1.5)
BILIRUB CONJ SERPL-MCNC: 0.2 MG/DL (ref 0–0.3)
BILIRUB INDIRECT SERPL-MCNC: 0.6 MG/DL
BILIRUB SERPL-MCNC: 0.8 MG/DL (ref 0–1.2)
BUN SERPL-MCNC: 13 MG/DL (ref 8–23)
BUN/CREAT SERPL: 15.1 (ref 7–25)
CALCIUM SPEC-SCNC: 9.5 MG/DL (ref 8.6–10.5)
CHLORIDE SERPL-SCNC: 102 MMOL/L (ref 98–107)
CO2 SERPL-SCNC: 23.6 MMOL/L (ref 22–29)
CREAT SERPL-MCNC: 0.86 MG/DL (ref 0.57–1)
DEPRECATED RDW RBC AUTO: 41.6 FL (ref 37–54)
EGFRCR SERPLBLD CKD-EPI 2021: 76 ML/MIN/1.73
EOSINOPHIL # BLD AUTO: 0.23 10*3/MM3 (ref 0–0.4)
EOSINOPHIL NFR BLD AUTO: 2.7 % (ref 0.3–6.2)
ERYTHROCYTE [DISTWIDTH] IN BLOOD BY AUTOMATED COUNT: 13 % (ref 12.3–15.4)
GLUCOSE BLDC GLUCOMTR-MCNC: 137 MG/DL (ref 70–99)
GLUCOSE SERPL-MCNC: 147 MG/DL (ref 65–99)
HCT VFR BLD AUTO: 36.6 % (ref 34–46.6)
HGB BLD-MCNC: 12.6 G/DL (ref 12–15.9)
IMM GRANULOCYTES # BLD AUTO: 0.13 10*3/MM3 (ref 0–0.05)
IMM GRANULOCYTES NFR BLD AUTO: 1.5 % (ref 0–0.5)
LYMPHOCYTES # BLD AUTO: 1.88 10*3/MM3 (ref 0.7–3.1)
LYMPHOCYTES NFR BLD AUTO: 22.3 % (ref 19.6–45.3)
MAGNESIUM SERPL-MCNC: 1.8 MG/DL (ref 1.6–2.4)
MCH RBC QN AUTO: 30.3 PG (ref 26.6–33)
MCHC RBC AUTO-ENTMCNC: 34.4 G/DL (ref 31.5–35.7)
MCV RBC AUTO: 88 FL (ref 79–97)
MONOCYTES # BLD AUTO: 0.85 10*3/MM3 (ref 0.1–0.9)
MONOCYTES NFR BLD AUTO: 10.1 % (ref 5–12)
NEUTROPHILS NFR BLD AUTO: 5.29 10*3/MM3 (ref 1.7–7)
NEUTROPHILS NFR BLD AUTO: 62.8 % (ref 42.7–76)
NRBC BLD AUTO-RTO: 0 /100 WBC (ref 0–0.2)
PHOSPHATE SERPL-MCNC: 3.5 MG/DL (ref 2.5–4.5)
PLATELET # BLD AUTO: 229 10*3/MM3 (ref 140–450)
PMV BLD AUTO: 9.7 FL (ref 6–12)
POTASSIUM SERPL-SCNC: 4 MMOL/L (ref 3.5–5.2)
PROT SERPL-MCNC: 6.9 G/DL (ref 6–8.5)
RBC # BLD AUTO: 4.16 10*6/MM3 (ref 3.77–5.28)
SODIUM SERPL-SCNC: 137 MMOL/L (ref 136–145)
WBC NRBC COR # BLD: 8.43 10*3/MM3 (ref 3.4–10.8)

## 2023-02-20 PROCEDURE — 80076 HEPATIC FUNCTION PANEL: CPT | Performed by: FAMILY MEDICINE

## 2023-02-20 PROCEDURE — 80048 BASIC METABOLIC PNL TOTAL CA: CPT | Performed by: FAMILY MEDICINE

## 2023-02-20 PROCEDURE — 82962 GLUCOSE BLOOD TEST: CPT

## 2023-02-20 PROCEDURE — 84100 ASSAY OF PHOSPHORUS: CPT | Performed by: FAMILY MEDICINE

## 2023-02-20 PROCEDURE — 85025 COMPLETE CBC W/AUTO DIFF WBC: CPT | Performed by: FAMILY MEDICINE

## 2023-02-20 PROCEDURE — 94799 UNLISTED PULMONARY SVC/PX: CPT

## 2023-02-20 PROCEDURE — 99239 HOSP IP/OBS DSCHRG MGMT >30: CPT | Performed by: FAMILY MEDICINE

## 2023-02-20 PROCEDURE — 83735 ASSAY OF MAGNESIUM: CPT | Performed by: FAMILY MEDICINE

## 2023-02-20 RX ORDER — DOXYCYCLINE HYCLATE 100 MG/1
100 CAPSULE ORAL 2 TIMES DAILY
Qty: 10 CAPSULE | Refills: 0 | Status: SHIPPED | OUTPATIENT
Start: 2023-02-20 | End: 2023-02-25

## 2023-02-20 RX ADMIN — METOPROLOL TARTRATE 25 MG: 25 TABLET, FILM COATED ORAL at 09:31

## 2023-02-20 RX ADMIN — DOXYCYCLINE 100 MG: 100 INJECTION, POWDER, LYOPHILIZED, FOR SOLUTION INTRAVENOUS at 01:14

## 2023-02-20 RX ADMIN — ATORVASTATIN CALCIUM 80 MG: 40 TABLET, FILM COATED ORAL at 09:31

## 2023-02-20 RX ADMIN — FAMOTIDINE 40 MG: 20 TABLET ORAL at 09:31

## 2023-02-20 RX ADMIN — BUPROPION HYDROCHLORIDE 150 MG: 150 TABLET, EXTENDED RELEASE ORAL at 09:31

## 2023-02-21 ENCOUNTER — READMISSION MANAGEMENT (OUTPATIENT)
Dept: CALL CENTER | Facility: HOSPITAL | Age: 63
End: 2023-02-21
Payer: MEDICARE

## 2023-02-21 LAB
BACTERIA SPEC AEROBE CULT: NORMAL
BACTERIA SPEC AEROBE CULT: NORMAL

## 2023-02-21 NOTE — OUTREACH NOTE
Prep Survey    Flowsheet Row Responses   [facility] facility patient discharged from? [facility]   Is LACE score < 7 ? No   Eligibility Readm Mgmt   Discharge diagnosis Sepsis secondary to pneumonia, UTI, A-fib with RVR   Does the patient have one of the following disease processes/diagnoses(primary or secondary)? Sepsis   Does the patient have Home health ordered? No   Is there a DME ordered? No   Prep survey completed? Yes          Vita MARITNEZ - Registered Nurse

## 2023-02-22 LAB
BACTERIA SPEC AEROBE CULT: NORMAL
BACTERIA SPEC AEROBE CULT: NORMAL

## 2023-02-24 ENCOUNTER — READMISSION MANAGEMENT (OUTPATIENT)
Dept: CALL CENTER | Facility: HOSPITAL | Age: 63
End: 2023-02-24
Payer: MEDICARE

## 2023-02-24 NOTE — OUTREACH NOTE
Sepsis Week 1 Survey    Flowsheet Row Responses   [facility] patient discharged from? [facility]   Does the patient have one of the following disease processes/diagnoses(primary or secondary)? Sepsis   Week 1 attempt successful? No   Unsuccessful attempts Attempt 1          Suma FORTE - Registered Nurse

## 2023-02-28 ENCOUNTER — READMISSION MANAGEMENT (OUTPATIENT)
Dept: CALL CENTER | Facility: HOSPITAL | Age: 63
End: 2023-02-28
Payer: MEDICARE

## 2023-02-28 NOTE — OUTREACH NOTE
Sepsis Week 1 Survey    Flowsheet Row Responses   [facility] patient discharged from? [facility]   Does the patient have one of the following disease processes/diagnoses(primary or secondary)? Sepsis   Week 1 attempt successful? Yes   Call start time [phone]   Call end time 0909   Discharge diagnosis Sepsis secondary to pneumonia, UTI, A-fib with RVR   Person spoke with today (if not patient) and relationship    Meds reviewed with patient/caregiver? Yes   Is the patient having any side effects they believe may be caused by any medication additions or changes? No   Does the patient have all medications related to this admission filled (includes all antibiotics, inhalers, nebulizers,steroids,etc.) Yes   Is the patient taking all medications as directed (includes completed medication regime)? Yes   Does the patient have a primary care provider?  Yes   Does the patient have an appointment with their PCP within 7 days of discharge? Yes   Has the patient kept scheduled appointments due by today? Yes   Has home health visited the patient within 72 hours of discharge? N/A   Psychosocial issues? No   Did the patient receive a copy of their discharge instructions? Yes   Nursing interventions Reviewed instructions with patient   What is the patient's perception of their health status since discharge? Improving   Nursing interventions Nurse provided patient education   Is the patient/caregiver able to teach back TIME? I nfection - may have signs and symptoms of an infection, T emperature - higher or lower than normal, M ental Decline - confused, sleepy, difficult to arouse, E xtremely Ill - severe pain, discomfort, shortness of breath   Is patient/caregiver able to teach back steps to recovery at home? Eat a balanced diet, Set small, achievable goals for return to baseline health   Is the patient/caregiver able to teach back signs and symptoms of worsening condition: Fever, Rapid heart rate (>90), Shortness of  breath/rapid respiratory rate, Altered mental status(confusion/coma)   Is the patient/caregiver able to teach back the hierarchy of who to call/visit for symptoms/problems? PCP, Specialist, Home health nurse, Urgent Care, ED, 911 Yes   Week 1 call completed? Yes   Wrap up additional comments  reports Pt is doing better. No needs.           BREE BROWN - Registered Nurse

## 2023-04-10 ENCOUNTER — OFFICE VISIT (OUTPATIENT)
Dept: PULMONOLOGY | Facility: CLINIC | Age: 63
End: 2023-04-10
Payer: MEDICARE

## 2023-04-10 VITALS
OXYGEN SATURATION: 95 % | RESPIRATION RATE: 16 BRPM | BODY MASS INDEX: 28.44 KG/M2 | SYSTOLIC BLOOD PRESSURE: 116 MMHG | DIASTOLIC BLOOD PRESSURE: 78 MMHG | WEIGHT: 166.6 LBS | HEIGHT: 64 IN | HEART RATE: 63 BPM | TEMPERATURE: 98 F

## 2023-04-10 DIAGNOSIS — Z85.528 HISTORY OF RENAL CELL CANCER: ICD-10-CM

## 2023-04-10 DIAGNOSIS — R59.0 MEDIASTINAL LYMPHADENOPATHY: Primary | ICD-10-CM

## 2023-04-10 DIAGNOSIS — Z72.0 TOBACCO ABUSE: ICD-10-CM

## 2023-04-10 PROCEDURE — 99203 OFFICE O/P NEW LOW 30 MIN: CPT | Performed by: INTERNAL MEDICINE

## 2023-04-10 PROCEDURE — 1159F MED LIST DOCD IN RCRD: CPT | Performed by: INTERNAL MEDICINE

## 2023-04-10 PROCEDURE — 3078F DIAST BP <80 MM HG: CPT | Performed by: INTERNAL MEDICINE

## 2023-04-10 PROCEDURE — 3074F SYST BP LT 130 MM HG: CPT | Performed by: INTERNAL MEDICINE

## 2023-04-10 PROCEDURE — 1160F RVW MEDS BY RX/DR IN RCRD: CPT | Performed by: INTERNAL MEDICINE

## 2023-04-10 RX ORDER — DEUCRAVACITINIB 6 MG/1
TABLET, FILM COATED ORAL
COMMUNITY
Start: 2023-02-17

## 2023-04-10 NOTE — PROGRESS NOTES
Pulmonary Consultation    Chela Díaz MD,    Thank you for asking me to see Ese Aparicio for   Chief Complaint   Patient presents with   • Establish Care     Bilateral hilar adenopathy   • Shortness of Breath     Mostly when active   .      History of Present Illness  Ese Aparicio is a 62 y.o. female with a PMH significant for renal cell cancer s/p nephrectomy and pneumonia in February presents for evaluation on account of abnormal CT scan which had showed hilar lymphadenopathy patient is cutting back on her smoking and she denies any significant cough expectoration or shortness of breath except for on heavy exertion there is no history of weight loss or night sweats      Tobacco use history:  Type: cigarettes  Amount: 0.50 ppd  Duration: 45 years  Cessation: n   Willing to quit: Yes      Review of Systems: History obtained from chart review and the patient.  Review of Systems   Respiratory: Positive for shortness of breath.    All other systems reviewed and are negative.    As described in the HPI. Otherwise, remainder of ROS (14 systems) were negative.    Patient Active Problem List   Diagnosis   • Absent kidney   • Essential hypertension   • Gastroesophageal reflux disease   • Hyperlipidemia   • Migraine headache   • Mixed anxiety and depressive disorder   • Paroxysmal atrial fibrillation   • Stage 3 chronic kidney disease   • Type 2 diabetes mellitus   • Pneumonia of right lower lobe due to infectious organism         Current Outpatient Medications:   •  atorvastatin (LIPITOR) 80 MG tablet, Take 1 tablet by mouth Daily., Disp: , Rfl:   •  buPROPion SR (WELLBUTRIN SR) 150 MG 12 hr tablet, Take 1 tablet by mouth 2 (Two) Times a Day., Disp: , Rfl:   •  famotidine (PEPCID) 40 MG tablet, Take 1 tablet by mouth Daily., Disp: , Rfl:   •  glipizide (GLUCOTROL XL) 10 MG 24 hr tablet, Take 1 tablet by mouth Daily., Disp: , Rfl:   •  metoprolol tartrate (LOPRESSOR) 25 MG tablet, Take 1 tablet by mouth 2 (Two)  Times a Day., Disp: , Rfl:   •  amLODIPine-atorvastatin (CADUET) 10-40 MG per tablet, Take 1 tablet by mouth Daily., Disp: , Rfl:   •  Sotyktu 6 MG tablet, , Disp: , Rfl:     Allergies   Allergen Reactions   • Cyclobenzaprine Nausea And Vomiting   • Amoxicillin-Pot Clavulanate Itching   • Cephalosporins Hives   • Cephalexin Rash       Past Medical History:   Diagnosis Date   • Atrial fibrillation    • Cancer    • Hypertension      Past Surgical History:   Procedure Laterality Date   • NEPHRECTOMY Right      Social History     Socioeconomic History   • Marital status:    Tobacco Use   • Smoking status: Every Day     Packs/day: 0.50     Years: 45.00     Pack years: 22.50     Types: Cigarettes     Start date: 1977     Passive exposure: Current   • Smokeless tobacco: Never   Vaping Use   • Vaping Use: Former   • Start date: 5/4/2021   • Quit date: 10/14/2021   • Substances: Nicotine, Flavoring   • Devices: Refillable tank   Substance and Sexual Activity   • Alcohol use: Never   • Drug use: Never   • Sexual activity: Defer     History reviewed. No pertinent family history.    CT Chest With Contrast Diagnostic    Result Date: 2/18/2023    1. No evidence of pulmonary embolism 2. Hilar and mediastinal adenopathy, as above.  This is favored to be infectious/inflammatory in etiology.  Neoplastic disease is not completely excluded. 3. Small right pleural effusion 4. Right basilar consolidation most consistent with pneumonia. 5. Mild emphysematous changes 6. Diffuse hepatic steatosis 7. Previous right nephrectomy     Domenic Broussard M.D.       Electronically Signed and Approved By: Domenic Broussard M.D. on 2/18/2023 at 20:02             XR Chest 1 View    Result Date: 2/16/2023   Possible new right basilar pneumonia.      Please note that portions of this note were completed with a voice recognition program.  GEORGE TRUJILLO JR, MD       Electronically Signed and Approved By: GEORGE TRUJILLO JR, MD on 2/16/2023 at 23:07          "           Objective     Blood pressure 116/78, pulse 63, temperature 98 °F (36.7 °C), temperature source Tympanic, resp. rate 16, height 162.6 cm (64\"), weight 75.6 kg (166 lb 9.6 oz), SpO2 95 %.  Physical Exam  Vitals and nursing note reviewed.   Constitutional:       Appearance: Normal appearance.   HENT:      Head: Normocephalic and atraumatic.      Nose: Nose normal.      Mouth/Throat:      Mouth: Mucous membranes are moist.      Pharynx: Oropharynx is clear.   Eyes:      Extraocular Movements: Extraocular movements intact.      Conjunctiva/sclera: Conjunctivae normal.      Pupils: Pupils are equal, round, and reactive to light.   Cardiovascular:      Rate and Rhythm: Normal rate and regular rhythm.      Pulses: Normal pulses.      Heart sounds: Normal heart sounds.   Pulmonary:      Effort: Pulmonary effort is normal.      Breath sounds: Normal breath sounds.   Abdominal:      General: Abdomen is flat. Bowel sounds are normal.      Palpations: Abdomen is soft.   Musculoskeletal:         General: Normal range of motion.      Cervical back: Normal range of motion and neck supple.   Skin:     General: Skin is warm.      Capillary Refill: Capillary refill takes 2 to 3 seconds.   Neurological:      General: No focal deficit present.      Mental Status: She is alert and oriented to person, place, and time.   Psychiatric:         Mood and Affect: Mood normal.         Behavior: Behavior normal.       Immunization History   Administered Date(s) Administered   • FluLaval/Fluzone >6mos 01/15/2019, 11/04/2019   • Fluad Quad 65+ 01/18/2016   • Pneumococcal Conjugate 13-Valent (PCV13) 02/24/2023            Assessment & Plan     Diagnoses and all orders for this visit:    1. Mediastinal lymphadenopathy (Primary)  -     CT Chest With Contrast; Future  -     Full Pulmonary Function Test With Bronchodilator; Future    2. Tobacco abuse  -     CT Chest With Contrast; Future  -     Full Pulmonary Function Test With Bronchodilator; " Future    3. History of renal cell cancer         Discussion/ Recommendations:   CT scan reviewed and results discussed with the patient patient was noted to have hilar and mediastinal lymphadenopathy with pneumonia  We will plan to repeat CT scan in 3 months time  Patient is strongly advised to stop smoking and she is agreeable  Will order PFTs to rule out any COPD  Discussed vaccination and recommended    BMI is >= 25 and <30. (Overweight) The following options were offered after discussion;: exercise counseling/recommendations           Return in about 3 months (around 7/10/2023).      Thank you for allowing me to participate in the care of Ese Aparicio. Please do not hesitate to contact me with any questions.         This document has been electronically signed by Aaron Johnson MD on April 10, 2023 08:46 EDT

## 2025-02-28 ENCOUNTER — TELEPHONE (OUTPATIENT)
Age: 65
End: 2025-02-28
Payer: MEDICARE